# Patient Record
Sex: FEMALE | Race: WHITE | Employment: OTHER
[De-identification: names, ages, dates, MRNs, and addresses within clinical notes are randomized per-mention and may not be internally consistent; named-entity substitution may affect disease eponyms.]

---

## 2017-01-01 ENCOUNTER — TELEPHONE (OUTPATIENT)
Dept: OTHER | Facility: CLINIC | Age: 82
End: 2017-01-01

## 2017-01-10 PROBLEM — N39.0 UTI (URINARY TRACT INFECTION): Status: ACTIVE | Noted: 2017-01-10

## 2017-01-10 PROBLEM — C52 VAGINAL CANCER (HCC): Status: ACTIVE | Noted: 2017-01-10

## 2017-01-10 PROBLEM — D50.0 ANEMIA, BLOOD LOSS: Status: ACTIVE | Noted: 2017-01-10

## 2017-01-10 PROBLEM — N18.9 CKD (CHRONIC KIDNEY DISEASE): Chronic | Status: ACTIVE | Noted: 2017-01-10

## 2017-01-11 PROBLEM — N17.9 AKI (ACUTE KIDNEY INJURY) (HCC): Status: ACTIVE | Noted: 2017-01-11

## 2017-01-11 PROBLEM — N93.9 VAGINAL BLEEDING: Status: ACTIVE | Noted: 2017-01-11

## 2017-01-11 PROBLEM — C78.00 LUNG METASTASES (HCC): Status: ACTIVE | Noted: 2017-01-11

## 2017-01-11 PROBLEM — C54.1 ENDOMETRIAL CANCER, FIGO STAGE IVB (HCC): Status: ACTIVE | Noted: 2017-01-10

## 2017-01-11 PROBLEM — D62 ACUTE BLOOD LOSS ANEMIA: Status: ACTIVE | Noted: 2017-01-10

## 2017-02-28 PROBLEM — C52 VAGINAL CANCER (HCC): Status: ACTIVE | Noted: 2017-02-28

## 2017-11-08 NOTE — TELEPHONE ENCOUNTER
Attempted to contact patient regarding need for A1C.   There was no answer or way to leave a message

## 2018-01-01 ENCOUNTER — APPOINTMENT (OUTPATIENT)
Dept: CT IMAGING | Age: 83
End: 2018-01-01
Payer: MEDICARE

## 2018-01-01 ENCOUNTER — TELEPHONE (OUTPATIENT)
Dept: INFUSION THERAPY | Age: 83
End: 2018-01-01

## 2018-01-01 ENCOUNTER — HOSPITAL ENCOUNTER (EMERGENCY)
Age: 83
Discharge: NURSING, REHAB OR LONG-TERM CARE | End: 2018-07-05
Attending: EMERGENCY MEDICINE
Payer: MEDICARE

## 2018-01-01 ENCOUNTER — OFFICE VISIT (OUTPATIENT)
Dept: ONCOLOGY | Age: 83
End: 2018-01-01
Payer: MEDICARE

## 2018-01-01 ENCOUNTER — TELEPHONE (OUTPATIENT)
Dept: PALLATIVE CARE | Age: 83
End: 2018-01-01

## 2018-01-01 ENCOUNTER — TELEPHONE (OUTPATIENT)
Dept: FAMILY MEDICINE CLINIC | Age: 83
End: 2018-01-01

## 2018-01-01 ENCOUNTER — OFFICE VISIT (OUTPATIENT)
Dept: FAMILY MEDICINE CLINIC | Age: 83
End: 2018-01-01
Payer: MEDICARE

## 2018-01-01 ENCOUNTER — CLINICAL DOCUMENTATION (OUTPATIENT)
Dept: ONCOLOGY | Age: 83
End: 2018-01-01

## 2018-01-01 ENCOUNTER — HOSPITAL ENCOUNTER (OUTPATIENT)
Dept: INFUSION THERAPY | Age: 83
Discharge: HOME | End: 2018-05-09
Payer: MEDICARE

## 2018-01-01 ENCOUNTER — HOSPITAL ENCOUNTER (OUTPATIENT)
Dept: INFUSION THERAPY | Age: 83
Discharge: HOME | End: 2018-06-08
Payer: MEDICARE

## 2018-01-01 ENCOUNTER — HOSPITAL ENCOUNTER (OUTPATIENT)
Dept: INFUSION THERAPY | Age: 83
Discharge: HOME | End: 2018-04-18
Payer: MEDICARE

## 2018-01-01 ENCOUNTER — HOSPITAL ENCOUNTER (OUTPATIENT)
Dept: INFUSION THERAPY | Age: 83
Discharge: HOME | End: 2018-04-12
Payer: MEDICARE

## 2018-01-01 ENCOUNTER — HOSPITAL ENCOUNTER (OUTPATIENT)
Dept: INFUSION THERAPY | Age: 83
Discharge: HOME | End: 2018-06-07
Payer: MEDICARE

## 2018-01-01 ENCOUNTER — HOSPITAL ENCOUNTER (OUTPATIENT)
Age: 83
Discharge: HOME | End: 2018-06-20
Payer: MEDICARE

## 2018-01-01 ENCOUNTER — HOSPITAL ENCOUNTER (OUTPATIENT)
Dept: INFUSION THERAPY | Age: 83
Discharge: HOME | End: 2018-04-11
Payer: MEDICARE

## 2018-01-01 ENCOUNTER — APPOINTMENT (OUTPATIENT)
Dept: INFUSION THERAPY | Age: 83
End: 2018-01-01
Payer: MEDICARE

## 2018-01-01 ENCOUNTER — HOSPITAL ENCOUNTER (OUTPATIENT)
Dept: INFUSION THERAPY | Age: 83
Discharge: HOME | End: 2018-04-19
Payer: MEDICARE

## 2018-01-01 ENCOUNTER — APPOINTMENT (OUTPATIENT)
Dept: GENERAL RADIOLOGY | Age: 83
End: 2018-01-01
Payer: MEDICARE

## 2018-01-01 ENCOUNTER — HOSPITAL ENCOUNTER (OUTPATIENT)
Age: 83
Discharge: HOME | End: 2018-04-07
Payer: MEDICARE

## 2018-01-01 ENCOUNTER — HOSPITAL ENCOUNTER (OUTPATIENT)
Age: 83
Discharge: HOME | End: 2018-03-14
Payer: MEDICARE

## 2018-01-01 ENCOUNTER — OFFICE VISIT (OUTPATIENT)
Dept: PALLATIVE CARE | Age: 83
End: 2018-01-01
Payer: MEDICARE

## 2018-01-01 VITALS
WEIGHT: 151.4 LBS | HEIGHT: 62 IN | SYSTOLIC BLOOD PRESSURE: 155 MMHG | DIASTOLIC BLOOD PRESSURE: 45 MMHG | BODY MASS INDEX: 27.86 KG/M2 | TEMPERATURE: 97.9 F | HEART RATE: 70 BPM

## 2018-01-01 VITALS
BODY MASS INDEX: 28.71 KG/M2 | DIASTOLIC BLOOD PRESSURE: 70 MMHG | WEIGHT: 156 LBS | OXYGEN SATURATION: 99 % | HEIGHT: 62 IN | RESPIRATION RATE: 16 BRPM | SYSTOLIC BLOOD PRESSURE: 112 MMHG | TEMPERATURE: 97.8 F | HEART RATE: 73 BPM

## 2018-01-01 VITALS
SYSTOLIC BLOOD PRESSURE: 171 MMHG | DIASTOLIC BLOOD PRESSURE: 72 MMHG | TEMPERATURE: 97.8 F | RESPIRATION RATE: 20 BRPM | HEART RATE: 64 BPM

## 2018-01-01 VITALS
SYSTOLIC BLOOD PRESSURE: 138 MMHG | OXYGEN SATURATION: 97 % | HEART RATE: 63 BPM | HEIGHT: 62 IN | BODY MASS INDEX: 28.71 KG/M2 | DIASTOLIC BLOOD PRESSURE: 82 MMHG | RESPIRATION RATE: 18 BRPM | WEIGHT: 156 LBS

## 2018-01-01 VITALS
OXYGEN SATURATION: 100 % | SYSTOLIC BLOOD PRESSURE: 132 MMHG | BODY MASS INDEX: 27.33 KG/M2 | HEART RATE: 70 BPM | WEIGHT: 149.4 LBS | DIASTOLIC BLOOD PRESSURE: 60 MMHG

## 2018-01-01 VITALS
DIASTOLIC BLOOD PRESSURE: 84 MMHG | OXYGEN SATURATION: 98 % | HEART RATE: 79 BPM | WEIGHT: 160 LBS | SYSTOLIC BLOOD PRESSURE: 138 MMHG | HEIGHT: 62 IN | BODY MASS INDEX: 29.44 KG/M2

## 2018-01-01 VITALS
TEMPERATURE: 99 F | HEART RATE: 68 BPM | RESPIRATION RATE: 20 BRPM | DIASTOLIC BLOOD PRESSURE: 72 MMHG | SYSTOLIC BLOOD PRESSURE: 169 MMHG

## 2018-01-01 VITALS
SYSTOLIC BLOOD PRESSURE: 149 MMHG | HEIGHT: 62 IN | TEMPERATURE: 97.5 F | RESPIRATION RATE: 20 BRPM | DIASTOLIC BLOOD PRESSURE: 75 MMHG | HEART RATE: 65 BPM | BODY MASS INDEX: 28.06 KG/M2 | WEIGHT: 152.5 LBS

## 2018-01-01 VITALS
BODY MASS INDEX: 28.69 KG/M2 | TEMPERATURE: 97.9 F | DIASTOLIC BLOOD PRESSURE: 63 MMHG | WEIGHT: 155.9 LBS | HEART RATE: 72 BPM | RESPIRATION RATE: 20 BRPM | SYSTOLIC BLOOD PRESSURE: 150 MMHG | HEIGHT: 62 IN

## 2018-01-01 VITALS
WEIGHT: 156 LBS | BODY MASS INDEX: 28.71 KG/M2 | HEART RATE: 80 BPM | OXYGEN SATURATION: 97 % | HEIGHT: 62 IN | SYSTOLIC BLOOD PRESSURE: 114 MMHG | RESPIRATION RATE: 16 BRPM | DIASTOLIC BLOOD PRESSURE: 68 MMHG | TEMPERATURE: 98 F

## 2018-01-01 VITALS
BODY MASS INDEX: 29.24 KG/M2 | SYSTOLIC BLOOD PRESSURE: 154 MMHG | HEART RATE: 72 BPM | DIASTOLIC BLOOD PRESSURE: 76 MMHG | TEMPERATURE: 98.8 F | RESPIRATION RATE: 20 BRPM | WEIGHT: 158.9 LBS | HEIGHT: 62 IN

## 2018-01-01 VITALS
RESPIRATION RATE: 16 BRPM | OXYGEN SATURATION: 99 % | WEIGHT: 144.4 LBS | HEART RATE: 85 BPM | DIASTOLIC BLOOD PRESSURE: 51 MMHG | SYSTOLIC BLOOD PRESSURE: 129 MMHG | HEIGHT: 60 IN | BODY MASS INDEX: 28.35 KG/M2 | TEMPERATURE: 98.4 F

## 2018-01-01 VITALS — RESPIRATION RATE: 18 BRPM | DIASTOLIC BLOOD PRESSURE: 71 MMHG | SYSTOLIC BLOOD PRESSURE: 162 MMHG | HEART RATE: 61 BPM

## 2018-01-01 DIAGNOSIS — N76.0 RADIATION VAGINITIS: ICD-10-CM

## 2018-01-01 DIAGNOSIS — E78.00 PURE HYPERCHOLESTEROLEMIA: ICD-10-CM

## 2018-01-01 DIAGNOSIS — Z91.81 AT HIGH RISK FOR FALLS: Primary | ICD-10-CM

## 2018-01-01 DIAGNOSIS — C54.1 ENDOMETRIAL CANCER, FIGO STAGE IIIA (HCC): ICD-10-CM

## 2018-01-01 DIAGNOSIS — C54.1 ENDOMETRIAL CANCER, FIGO STAGE IIIA (HCC): Primary | ICD-10-CM

## 2018-01-01 DIAGNOSIS — D64.9 POSTOPERATIVE ANEMIA: Primary | ICD-10-CM

## 2018-01-01 DIAGNOSIS — R30.0 DYSURIA: ICD-10-CM

## 2018-01-01 DIAGNOSIS — N95.0 POST-MENOPAUSAL BLEEDING: ICD-10-CM

## 2018-01-01 DIAGNOSIS — N93.9 VAGINAL BLEEDING: ICD-10-CM

## 2018-01-01 DIAGNOSIS — D50.8 OTHER IRON DEFICIENCY ANEMIA: ICD-10-CM

## 2018-01-01 DIAGNOSIS — B37.0 OROPHARYNGEAL CANDIDIASIS: Primary | ICD-10-CM

## 2018-01-01 DIAGNOSIS — N39.0 URINARY TRACT INFECTION WITHOUT HEMATURIA, SITE UNSPECIFIED: Primary | ICD-10-CM

## 2018-01-01 DIAGNOSIS — L30.8 DERMATITIS ASSOCIATED WITH MOISTURE: ICD-10-CM

## 2018-01-01 DIAGNOSIS — R32 URINARY INCONTINENCE, UNSPECIFIED TYPE: Primary | ICD-10-CM

## 2018-01-01 DIAGNOSIS — D64.9 ANEMIA, UNSPECIFIED TYPE: ICD-10-CM

## 2018-01-01 DIAGNOSIS — N18.30 STAGE 3 CHRONIC KIDNEY DISEASE (HCC): Chronic | ICD-10-CM

## 2018-01-01 DIAGNOSIS — R32 URINARY INCONTINENCE, UNSPECIFIED TYPE: ICD-10-CM

## 2018-01-01 DIAGNOSIS — C54.1 ENDOMETRIAL CA (HCC): ICD-10-CM

## 2018-01-01 DIAGNOSIS — E11.22 TYPE 2 DIABETES MELLITUS WITH CHRONIC KIDNEY DISEASE, WITHOUT LONG-TERM CURRENT USE OF INSULIN, UNSPECIFIED CKD STAGE (HCC): ICD-10-CM

## 2018-01-01 DIAGNOSIS — R30.0 DYSURIA: Primary | ICD-10-CM

## 2018-01-01 DIAGNOSIS — C52 VAGINAL CANCER (HCC): ICD-10-CM

## 2018-01-01 DIAGNOSIS — N17.9 AKI (ACUTE KIDNEY INJURY) (HCC): ICD-10-CM

## 2018-01-01 DIAGNOSIS — M70.22 OLECRANON BURSITIS OF LEFT ELBOW: ICD-10-CM

## 2018-01-01 DIAGNOSIS — D50.8 OTHER IRON DEFICIENCY ANEMIA: Primary | ICD-10-CM

## 2018-01-01 DIAGNOSIS — N17.9 ACUTE RENAL FAILURE, UNSPECIFIED ACUTE RENAL FAILURE TYPE (HCC): Primary | ICD-10-CM

## 2018-01-01 DIAGNOSIS — Z51.5 PALLIATIVE CARE BY SPECIALIST: ICD-10-CM

## 2018-01-01 DIAGNOSIS — G89.29 CHRONIC BILATERAL LOW BACK PAIN WITHOUT SCIATICA: ICD-10-CM

## 2018-01-01 DIAGNOSIS — D64.9 POSTOPERATIVE ANEMIA: ICD-10-CM

## 2018-01-01 DIAGNOSIS — M54.50 CHRONIC BILATERAL LOW BACK PAIN WITHOUT SCIATICA: ICD-10-CM

## 2018-01-01 DIAGNOSIS — I10 ESSENTIAL HYPERTENSION: ICD-10-CM

## 2018-01-01 DIAGNOSIS — R06.2 WHEEZING: ICD-10-CM

## 2018-01-01 DIAGNOSIS — C78.00 MALIGNANT NEOPLASM METASTATIC TO LUNG, UNSPECIFIED LATERALITY (HCC): ICD-10-CM

## 2018-01-01 DIAGNOSIS — R19.7 DIARRHEA, UNSPECIFIED TYPE: Primary | ICD-10-CM

## 2018-01-01 LAB
ABO/RH: NORMAL
ALBUMIN SERPL-MCNC: 3.8 G/DL (ref 3.5–5.2)
ALBUMIN SERPL-MCNC: 4 G/DL (ref 3.5–5.2)
ALP BLD-CCNC: 45 U/L (ref 35–104)
ALP BLD-CCNC: 56 U/L (ref 35–104)
ALT SERPL-CCNC: 10 U/L (ref 0–32)
ALT SERPL-CCNC: 12 U/L (ref 0–32)
AMORPHOUS: ABNORMAL
ANION GAP SERPL CALCULATED.3IONS-SCNC: 14 MMOL/L (ref 7–16)
ANION GAP SERPL CALCULATED.3IONS-SCNC: 18 MMOL/L (ref 7–16)
ANION GAP SERPL CALCULATED.3IONS-SCNC: 21 MMOL/L (ref 7–16)
ANTIBODY SCREEN: NORMAL
AST SERPL-CCNC: 12 U/L (ref 0–31)
AST SERPL-CCNC: 15 U/L (ref 0–31)
BACTERIA: ABNORMAL /HPF
BASOPHILS ABSOLUTE: 0.03 E9/L (ref 0–0.2)
BASOPHILS ABSOLUTE: 0.05 E9/L (ref 0–0.2)
BASOPHILS ABSOLUTE: 0.05 E9/L (ref 0–0.2)
BASOPHILS ABSOLUTE: 0.07 E9/L (ref 0–0.2)
BASOPHILS RELATIVE PERCENT: 0.6 % (ref 0–2)
BASOPHILS RELATIVE PERCENT: 0.7 % (ref 0–2)
BASOPHILS RELATIVE PERCENT: 0.7 % (ref 0–2)
BASOPHILS RELATIVE PERCENT: 0.8 % (ref 0–2)
BILIRUB SERPL-MCNC: 0.2 MG/DL (ref 0–1.2)
BILIRUB SERPL-MCNC: <0.2 MG/DL (ref 0–1.2)
BILIRUBIN URINE: ABNORMAL
BILIRUBIN, POC: NEGATIVE
BLOOD BANK DISPENSE STATUS: NORMAL
BLOOD BANK PRODUCT CODE: NORMAL
BLOOD URINE, POC: NORMAL
BLOOD, URINE: ABNORMAL
BODY FLUID CULTURE, STERILE: NORMAL
BPU ID: NORMAL
BUN BLDV-MCNC: 18 MG/DL (ref 8–23)
BUN BLDV-MCNC: 20 MG/DL (ref 8–23)
BUN BLDV-MCNC: 58 MG/DL (ref 8–23)
CA 125: 44.3 U/ML (ref 0–35)
CA 125: 50.8 U/ML (ref 0–35)
CALCIUM SERPL-MCNC: 8.9 MG/DL (ref 8.6–10.2)
CALCIUM SERPL-MCNC: 9.4 MG/DL (ref 8.6–10.2)
CALCIUM SERPL-MCNC: 9.7 MG/DL (ref 8.6–10.2)
CHLORIDE BLD-SCNC: 93 MMOL/L (ref 98–107)
CHLORIDE BLD-SCNC: 96 MMOL/L (ref 98–107)
CHLORIDE BLD-SCNC: 99 MMOL/L (ref 98–107)
CLARITY, POC: CLEAR
CLARITY: ABNORMAL
CO2: 15 MMOL/L (ref 22–29)
CO2: 23 MMOL/L (ref 22–29)
CO2: 25 MMOL/L (ref 22–29)
COLOR, POC: YELLOW
COLOR: ABNORMAL
CREAT SERPL-MCNC: 1.2 MG/DL (ref 0.5–1)
CREAT SERPL-MCNC: 1.3 MG/DL (ref 0.5–1)
CREAT SERPL-MCNC: 4.3 MG/DL (ref 0.5–1)
CRYSTALS, UA: ABNORMAL
DESCRIPTION BLOOD BANK: NORMAL
EKG ATRIAL RATE: 77 BPM
EKG P AXIS: 73 DEGREES
EKG P-R INTERVAL: 218 MS
EKG Q-T INTERVAL: 374 MS
EKG QRS DURATION: 82 MS
EKG QTC CALCULATION (BAZETT): 423 MS
EKG R AXIS: -12 DEGREES
EKG T AXIS: 172 DEGREES
EKG VENTRICULAR RATE: 77 BPM
EOSINOPHILS ABSOLUTE: 0.07 E9/L (ref 0.05–0.5)
EOSINOPHILS ABSOLUTE: 0.09 E9/L (ref 0.05–0.5)
EOSINOPHILS ABSOLUTE: 0.1 E9/L (ref 0.05–0.5)
EOSINOPHILS ABSOLUTE: 0.15 E9/L (ref 0.05–0.5)
EOSINOPHILS RELATIVE PERCENT: 1.2 % (ref 0–6)
EOSINOPHILS RELATIVE PERCENT: 1.2 % (ref 0–6)
EOSINOPHILS RELATIVE PERCENT: 1.4 % (ref 0–6)
EOSINOPHILS RELATIVE PERCENT: 1.9 % (ref 0–6)
FERRITIN: 116 NG/ML
GFR AFRICAN AMERICAN: 12
GFR AFRICAN AMERICAN: 48
GFR AFRICAN AMERICAN: 52
GFR NON-AFRICAN AMERICAN: 10 ML/MIN/1.73
GFR NON-AFRICAN AMERICAN: 39 ML/MIN/1.73
GFR NON-AFRICAN AMERICAN: 43 ML/MIN/1.73
GLUCOSE BLD-MCNC: 101 MG/DL (ref 74–109)
GLUCOSE BLD-MCNC: 68 MG/DL (ref 74–109)
GLUCOSE BLD-MCNC: 80 MG/DL (ref 74–109)
GLUCOSE URINE, POC: NEGATIVE
GLUCOSE URINE: 100 MG/DL
GRAM STAIN RESULT: NORMAL
HBA1C MFR BLD: 5.5 %
HCT VFR BLD CALC: 27.1 % (ref 34–48)
HCT VFR BLD CALC: 28 % (ref 34–48)
HCT VFR BLD CALC: 30.8 % (ref 34–48)
HCT VFR BLD CALC: 31.1 % (ref 34–48)
HCT VFR BLD CALC: 32.8 % (ref 34–48)
HCT VFR BLD CALC: 37.1 % (ref 34–48)
HEMOGLOBIN: 10.2 G/DL (ref 11.5–15.5)
HEMOGLOBIN: 12.2 G/DL (ref 11.5–15.5)
HEMOGLOBIN: 8.5 G/DL (ref 11.5–15.5)
HEMOGLOBIN: 8.8 G/DL (ref 11.5–15.5)
HEMOGLOBIN: 9.5 G/DL (ref 11.5–15.5)
HEMOGLOBIN: 9.9 G/DL (ref 11.5–15.5)
IMMATURE GRANULOCYTES #: 0.02 E9/L
IMMATURE GRANULOCYTES #: 0.03 E9/L
IMMATURE GRANULOCYTES #: 0.03 E9/L
IMMATURE GRANULOCYTES #: 0.05 E9/L
IMMATURE GRANULOCYTES %: 0.4 % (ref 0–5)
IMMATURE GRANULOCYTES %: 0.4 % (ref 0–5)
IMMATURE GRANULOCYTES %: 0.5 % (ref 0–5)
IMMATURE GRANULOCYTES %: 0.5 % (ref 0–5)
IRON SATURATION: 16 % (ref 15–50)
IRON: 35 MCG/DL (ref 37–145)
KETONES, POC: NEGATIVE
KETONES, URINE: 15 MG/DL
LACTIC ACID: 1.1 MMOL/L (ref 0.5–2.2)
LEUKOCYTE EST, POC: NORMAL
LEUKOCYTE ESTERASE, URINE: ABNORMAL
LYMPHOCYTES ABSOLUTE: 0.84 E9/L (ref 1.5–4)
LYMPHOCYTES ABSOLUTE: 0.93 E9/L (ref 1.5–4)
LYMPHOCYTES ABSOLUTE: 1.08 E9/L (ref 1.5–4)
LYMPHOCYTES ABSOLUTE: 1.36 E9/L (ref 1.5–4)
LYMPHOCYTES RELATIVE PERCENT: 11.2 % (ref 20–42)
LYMPHOCYTES RELATIVE PERCENT: 12.8 % (ref 20–42)
LYMPHOCYTES RELATIVE PERCENT: 14.5 % (ref 20–42)
LYMPHOCYTES RELATIVE PERCENT: 22.2 % (ref 20–42)
MCH RBC QN AUTO: 25.9 PG (ref 26–35)
MCH RBC QN AUTO: 26.5 PG (ref 26–35)
MCH RBC QN AUTO: 27 PG (ref 26–35)
MCH RBC QN AUTO: 27.4 PG (ref 26–35)
MCH RBC QN AUTO: 27.7 PG (ref 26–35)
MCH RBC QN AUTO: 28.2 PG (ref 26–35)
MCHC RBC AUTO-ENTMCNC: 30.5 % (ref 32–34.5)
MCHC RBC AUTO-ENTMCNC: 31.1 % (ref 32–34.5)
MCHC RBC AUTO-ENTMCNC: 31.4 % (ref 32–34.5)
MCHC RBC AUTO-ENTMCNC: 31.4 % (ref 32–34.5)
MCHC RBC AUTO-ENTMCNC: 32.1 % (ref 32–34.5)
MCHC RBC AUTO-ENTMCNC: 32.9 % (ref 32–34.5)
MCV RBC AUTO: 84.3 FL (ref 80–99.9)
MCV RBC AUTO: 84.7 FL (ref 80–99.9)
MCV RBC AUTO: 85.7 FL (ref 80–99.9)
MCV RBC AUTO: 86 FL (ref 80–99.9)
MCV RBC AUTO: 86.8 FL (ref 80–99.9)
MCV RBC AUTO: 87.4 FL (ref 80–99.9)
MONOCYTES ABSOLUTE: 0.36 E9/L (ref 0.1–0.95)
MONOCYTES ABSOLUTE: 0.59 E9/L (ref 0.1–0.95)
MONOCYTES ABSOLUTE: 0.65 E9/L (ref 0.1–0.95)
MONOCYTES ABSOLUTE: 0.99 E9/L (ref 0.1–0.95)
MONOCYTES RELATIVE PERCENT: 7.1 % (ref 2–12)
MONOCYTES RELATIVE PERCENT: 8.7 % (ref 2–12)
MONOCYTES RELATIVE PERCENT: 9.3 % (ref 2–12)
MONOCYTES RELATIVE PERCENT: 9.5 % (ref 2–12)
NEUTROPHILS ABSOLUTE: 2.46 E9/L (ref 1.8–7.3)
NEUTROPHILS ABSOLUTE: 5.57 E9/L (ref 1.8–7.3)
NEUTROPHILS ABSOLUTE: 6.59 E9/L (ref 1.8–7.3)
NEUTROPHILS ABSOLUTE: 8.02 E9/L (ref 1.8–7.3)
NEUTROPHILS RELATIVE PERCENT: 65.1 % (ref 43–80)
NEUTROPHILS RELATIVE PERCENT: 74.5 % (ref 43–80)
NEUTROPHILS RELATIVE PERCENT: 75.3 % (ref 43–80)
NEUTROPHILS RELATIVE PERCENT: 79.5 % (ref 43–80)
NITRITE, POC: NEGATIVE
NITRITE, URINE: POSITIVE
ORGANISM: ABNORMAL
PDW BLD-RTO: 13.6 FL (ref 11.5–15)
PDW BLD-RTO: 14 FL (ref 11.5–15)
PDW BLD-RTO: 14.2 FL (ref 11.5–15)
PDW BLD-RTO: 14.9 FL (ref 11.5–15)
PDW BLD-RTO: 15.4 FL (ref 11.5–15)
PDW BLD-RTO: 15.4 FL (ref 11.5–15)
PH UA: 7.5 (ref 5–9)
PH, POC: 6.5
PLATELET # BLD: 205 E9/L (ref 130–450)
PLATELET # BLD: 277 E9/L (ref 130–450)
PLATELET # BLD: 281 E9/L (ref 130–450)
PLATELET # BLD: 336 E9/L (ref 130–450)
PLATELET # BLD: 354 E9/L (ref 130–450)
PLATELET # BLD: 397 E9/L (ref 130–450)
PMV BLD AUTO: 8.7 FL (ref 7–12)
PMV BLD AUTO: 9 FL (ref 7–12)
PMV BLD AUTO: 9 FL (ref 7–12)
PMV BLD AUTO: 9.1 FL (ref 7–12)
PMV BLD AUTO: 9.3 FL (ref 7–12)
PMV BLD AUTO: 9.6 FL (ref 7–12)
POTASSIUM SERPL-SCNC: 4.7 MMOL/L (ref 3.5–5)
POTASSIUM SERPL-SCNC: 5.1 MMOL/L (ref 3.5–5)
POTASSIUM SERPL-SCNC: 5.5 MMOL/L (ref 3.5–5)
PROTEIN UA: >=300 MG/DL
PROTEIN, POC: NORMAL
RBC # BLD: 3.1 E12/L (ref 3.5–5.5)
RBC # BLD: 3.32 E12/L (ref 3.5–5.5)
RBC # BLD: 3.58 E12/L (ref 3.5–5.5)
RBC # BLD: 3.67 E12/L (ref 3.5–5.5)
RBC # BLD: 3.78 E12/L (ref 3.5–5.5)
RBC # BLD: 4.33 E12/L (ref 3.5–5.5)
RBC UA: >20 /HPF (ref 0–2)
SODIUM BLD-SCNC: 132 MMOL/L (ref 132–146)
SODIUM BLD-SCNC: 135 MMOL/L (ref 132–146)
SODIUM BLD-SCNC: 137 MMOL/L (ref 132–146)
SPECIFIC GRAVITY UA: 1.01 (ref 1–1.03)
SPECIFIC GRAVITY, POC: 1.01
TOTAL IRON BINDING CAPACITY: 214 MCG/DL (ref 250–450)
TOTAL PROTEIN: 6.9 G/DL (ref 6.4–8.3)
TOTAL PROTEIN: 7.7 G/DL (ref 6.4–8.3)
TROPONIN: 0.01 NG/ML (ref 0–0.03)
URINE CULTURE, ROUTINE: ABNORMAL
URINE CULTURE, ROUTINE: ABNORMAL
URINE CULTURE, ROUTINE: NORMAL
UROBILINOGEN, POC: 0.2
UROBILINOGEN, URINE: 4 E.U./DL
WBC # BLD: 10.6 E9/L (ref 4.5–11.5)
WBC # BLD: 3.8 E9/L (ref 4.5–11.5)
WBC # BLD: 7.5 E9/L (ref 4.5–11.5)
WBC # BLD: 8.3 E9/L (ref 4.5–11.5)
WBC # BLD: 8.5 E9/L (ref 4.5–11.5)
WBC # BLD: 9.5 E9/L (ref 4.5–11.5)
WBC UA: >20 /HPF (ref 0–5)

## 2018-01-01 PROCEDURE — 6360000002 HC RX W HCPCS: Performed by: INTERNAL MEDICINE

## 2018-01-01 PROCEDURE — G8417 CALC BMI ABV UP PARAM F/U: HCPCS | Performed by: FAMILY MEDICINE

## 2018-01-01 PROCEDURE — 99212 OFFICE O/P EST SF 10 MIN: CPT | Performed by: INTERNAL MEDICINE

## 2018-01-01 PROCEDURE — 87186 SC STD MICRODIL/AGAR DIL: CPT

## 2018-01-01 PROCEDURE — G8598 ASA/ANTIPLAT THER USED: HCPCS | Performed by: FAMILY MEDICINE

## 2018-01-01 PROCEDURE — 80053 COMPREHEN METABOLIC PANEL: CPT

## 2018-01-01 PROCEDURE — 87088 URINE BACTERIA CULTURE: CPT

## 2018-01-01 PROCEDURE — 1123F ACP DISCUSS/DSCN MKR DOCD: CPT | Performed by: FAMILY MEDICINE

## 2018-01-01 PROCEDURE — 2580000003 HC RX 258: Performed by: INTERNAL MEDICINE

## 2018-01-01 PROCEDURE — G8399 PT W/DXA RESULTS DOCUMENT: HCPCS | Performed by: FAMILY MEDICINE

## 2018-01-01 PROCEDURE — 85025 COMPLETE CBC W/AUTO DIFF WBC: CPT

## 2018-01-01 PROCEDURE — 1090F PRES/ABSN URINE INCON ASSESS: CPT | Performed by: INTERNAL MEDICINE

## 2018-01-01 PROCEDURE — 99214 OFFICE O/P EST MOD 30 MIN: CPT | Performed by: INTERNAL MEDICINE

## 2018-01-01 PROCEDURE — 99212 OFFICE O/P EST SF 10 MIN: CPT

## 2018-01-01 PROCEDURE — 70450 CT HEAD/BRAIN W/O DYE: CPT

## 2018-01-01 PROCEDURE — G8484 FLU IMMUNIZE NO ADMIN: HCPCS | Performed by: FAMILY MEDICINE

## 2018-01-01 PROCEDURE — 83540 ASSAY OF IRON: CPT

## 2018-01-01 PROCEDURE — 86901 BLOOD TYPING SEROLOGIC RH(D): CPT

## 2018-01-01 PROCEDURE — 99214 OFFICE O/P EST MOD 30 MIN: CPT | Performed by: FAMILY MEDICINE

## 2018-01-01 PROCEDURE — G8417 CALC BMI ABV UP PARAM F/U: HCPCS | Performed by: INTERNAL MEDICINE

## 2018-01-01 PROCEDURE — 83550 IRON BINDING TEST: CPT

## 2018-01-01 PROCEDURE — 36415 COLL VENOUS BLD VENIPUNCTURE: CPT

## 2018-01-01 PROCEDURE — 1036F TOBACCO NON-USER: CPT | Performed by: FAMILY MEDICINE

## 2018-01-01 PROCEDURE — 4040F PNEUMOC VAC/ADMIN/RCVD: CPT | Performed by: FAMILY MEDICINE

## 2018-01-01 PROCEDURE — 80048 BASIC METABOLIC PNL TOTAL CA: CPT

## 2018-01-01 PROCEDURE — G8598 ASA/ANTIPLAT THER USED: HCPCS | Performed by: INTERNAL MEDICINE

## 2018-01-01 PROCEDURE — 4040F PNEUMOC VAC/ADMIN/RCVD: CPT | Performed by: INTERNAL MEDICINE

## 2018-01-01 PROCEDURE — 71045 X-RAY EXAM CHEST 1 VIEW: CPT

## 2018-01-01 PROCEDURE — 1123F ACP DISCUSS/DSCN MKR DOCD: CPT | Performed by: INTERNAL MEDICINE

## 2018-01-01 PROCEDURE — 87077 CULTURE AEROBIC IDENTIFY: CPT

## 2018-01-01 PROCEDURE — 99204 OFFICE O/P NEW MOD 45 MIN: CPT | Performed by: FAMILY MEDICINE

## 2018-01-01 PROCEDURE — 83036 HEMOGLOBIN GLYCOSYLATED A1C: CPT | Performed by: FAMILY MEDICINE

## 2018-01-01 PROCEDURE — 96374 THER/PROPH/DIAG INJ IV PUSH: CPT

## 2018-01-01 PROCEDURE — G8399 PT W/DXA RESULTS DOCUMENT: HCPCS | Performed by: INTERNAL MEDICINE

## 2018-01-01 PROCEDURE — 82728 ASSAY OF FERRITIN: CPT

## 2018-01-01 PROCEDURE — 85027 COMPLETE CBC AUTOMATED: CPT

## 2018-01-01 PROCEDURE — 1090F PRES/ABSN URINE INCON ASSESS: CPT | Performed by: FAMILY MEDICINE

## 2018-01-01 PROCEDURE — 96376 TX/PRO/DX INJ SAME DRUG ADON: CPT

## 2018-01-01 PROCEDURE — 81001 URINALYSIS AUTO W/SCOPE: CPT

## 2018-01-01 PROCEDURE — G8427 DOCREV CUR MEDS BY ELIG CLIN: HCPCS | Performed by: INTERNAL MEDICINE

## 2018-01-01 PROCEDURE — 83605 ASSAY OF LACTIC ACID: CPT

## 2018-01-01 PROCEDURE — 36415 COLL VENOUS BLD VENIPUNCTURE: CPT | Performed by: INTERNAL MEDICINE

## 2018-01-01 PROCEDURE — 86850 RBC ANTIBODY SCREEN: CPT

## 2018-01-01 PROCEDURE — 99213 OFFICE O/P EST LOW 20 MIN: CPT | Performed by: FAMILY MEDICINE

## 2018-01-01 PROCEDURE — 86304 IMMUNOASSAY TUMOR CA 125: CPT

## 2018-01-01 PROCEDURE — 81002 URINALYSIS NONAUTO W/O SCOPE: CPT | Performed by: FAMILY MEDICINE

## 2018-01-01 PROCEDURE — 3288F FALL RISK ASSESSMENT DOCD: CPT | Performed by: FAMILY MEDICINE

## 2018-01-01 PROCEDURE — G8427 DOCREV CUR MEDS BY ELIG CLIN: HCPCS | Performed by: FAMILY MEDICINE

## 2018-01-01 PROCEDURE — 99291 CRITICAL CARE FIRST HOUR: CPT

## 2018-01-01 PROCEDURE — 2580000003 HC RX 258: Performed by: EMERGENCY MEDICINE

## 2018-01-01 PROCEDURE — 86900 BLOOD TYPING SEROLOGIC ABO: CPT

## 2018-01-01 PROCEDURE — 86923 COMPATIBILITY TEST ELECTRIC: CPT

## 2018-01-01 PROCEDURE — 2580000003 HC RX 258: Performed by: NURSE PRACTITIONER

## 2018-01-01 PROCEDURE — 1036F TOBACCO NON-USER: CPT | Performed by: INTERNAL MEDICINE

## 2018-01-01 PROCEDURE — 36430 TRANSFUSION BLD/BLD COMPNT: CPT

## 2018-01-01 PROCEDURE — 84484 ASSAY OF TROPONIN QUANT: CPT

## 2018-01-01 PROCEDURE — 87070 CULTURE OTHR SPECIMN AEROBIC: CPT

## 2018-01-01 PROCEDURE — 20605 DRAIN/INJ JOINT/BURSA W/O US: CPT | Performed by: FAMILY MEDICINE

## 2018-01-01 PROCEDURE — 99213 OFFICE O/P EST LOW 20 MIN: CPT

## 2018-01-01 PROCEDURE — 96365 THER/PROPH/DIAG IV INF INIT: CPT

## 2018-01-01 PROCEDURE — G8510 SCR DEP NEG, NO PLAN REQD: HCPCS | Performed by: FAMILY MEDICINE

## 2018-01-01 PROCEDURE — 6360000002 HC RX W HCPCS: Performed by: EMERGENCY MEDICINE

## 2018-01-01 PROCEDURE — P9016 RBC LEUKOCYTES REDUCED: HCPCS

## 2018-01-01 PROCEDURE — 87205 SMEAR GRAM STAIN: CPT

## 2018-01-01 PROCEDURE — 99205 OFFICE O/P NEW HI 60 MIN: CPT | Performed by: FAMILY MEDICINE

## 2018-01-01 RX ORDER — FENTANYL 12 UG/H
1 PATCH TRANSDERMAL
Qty: 7 PATCH | Refills: 0 | Status: SHIPPED | OUTPATIENT
Start: 2018-01-01 | End: 2018-01-01 | Stop reason: SDUPTHER

## 2018-01-01 RX ORDER — SODIUM CHLORIDE 9 MG/ML
INJECTION, SOLUTION INTRAVENOUS CONTINUOUS
Status: CANCELLED | OUTPATIENT
Start: 2018-01-01

## 2018-01-01 RX ORDER — AMOXICILLIN AND CLAVULANATE POTASSIUM 875; 125 MG/1; MG/1
1 TABLET, FILM COATED ORAL 2 TIMES DAILY
Qty: 20 TABLET | Refills: 0 | Status: SHIPPED | OUTPATIENT
Start: 2018-01-01 | End: 2018-01-01

## 2018-01-01 RX ORDER — METHYLPREDNISOLONE SODIUM SUCCINATE 125 MG/2ML
125 INJECTION, POWDER, LYOPHILIZED, FOR SOLUTION INTRAMUSCULAR; INTRAVENOUS ONCE
Status: CANCELLED | OUTPATIENT
Start: 2018-01-01 | End: 2018-01-01

## 2018-01-01 RX ORDER — SODIUM CHLORIDE 0.9 % (FLUSH) 0.9 %
10 SYRINGE (ML) INJECTION PRN
OUTPATIENT
Start: 2018-01-01

## 2018-01-01 RX ORDER — QUETIAPINE FUMARATE 25 MG/1
25 TABLET, FILM COATED ORAL EVERY 4 HOURS PRN
Qty: 90 TABLET | Refills: 3 | Status: SHIPPED | OUTPATIENT
Start: 2018-01-01

## 2018-01-01 RX ORDER — SODIUM CHLORIDE 0.9 % (FLUSH) 0.9 %
10 SYRINGE (ML) INJECTION PRN
Status: CANCELLED | OUTPATIENT
Start: 2018-01-01

## 2018-01-01 RX ORDER — CARVEDILOL 25 MG/1
TABLET ORAL
Qty: 60 TABLET | Refills: 1 | Status: SHIPPED | OUTPATIENT
Start: 2018-01-01 | End: 2018-01-01 | Stop reason: SDUPTHER

## 2018-01-01 RX ORDER — SODIUM CHLORIDE 9 MG/ML
INJECTION, SOLUTION INTRAVENOUS ONCE
Status: COMPLETED | OUTPATIENT
Start: 2018-01-01 | End: 2018-01-01

## 2018-01-01 RX ORDER — HEPARIN SODIUM (PORCINE) LOCK FLUSH IV SOLN 100 UNIT/ML 100 UNIT/ML
500 SOLUTION INTRAVENOUS PRN
OUTPATIENT
Start: 2018-01-01

## 2018-01-01 RX ORDER — FENTANYL 25 UG/H
1 PATCH TRANSDERMAL
Qty: 7 PATCH | Refills: 0 | Status: SHIPPED | OUTPATIENT
Start: 2018-01-01 | End: 2018-01-01 | Stop reason: SDUPTHER

## 2018-01-01 RX ORDER — HEPARIN SODIUM (PORCINE) LOCK FLUSH IV SOLN 100 UNIT/ML 100 UNIT/ML
500 SOLUTION INTRAVENOUS PRN
Status: CANCELLED | OUTPATIENT
Start: 2018-01-01

## 2018-01-01 RX ORDER — DIAPER,BRIEF,ADULT, DISPOSABLE
EACH MISCELLANEOUS
Qty: 100 EACH | Refills: 3 | Status: SHIPPED | OUTPATIENT
Start: 2018-01-01 | End: 2018-01-01 | Stop reason: ALTCHOICE

## 2018-01-01 RX ORDER — LISINOPRIL 20 MG/1
20 TABLET ORAL DAILY
Qty: 30 TABLET | Refills: 1 | Status: SHIPPED | OUTPATIENT
Start: 2018-01-01 | End: 2018-01-01 | Stop reason: SDUPTHER

## 2018-01-01 RX ORDER — SODIUM CHLORIDE 9 MG/ML
INJECTION, SOLUTION INTRAVENOUS ONCE
OUTPATIENT
Start: 2018-01-01 | End: 2018-01-01

## 2018-01-01 RX ORDER — FENTANYL CITRATE 50 UG/ML
25 INJECTION, SOLUTION INTRAMUSCULAR; INTRAVENOUS ONCE
Status: COMPLETED | OUTPATIENT
Start: 2018-01-01 | End: 2018-01-01

## 2018-01-01 RX ORDER — FENTANYL 25 UG/H
1 PATCH TRANSDERMAL
Qty: 7 PATCH | Refills: 0 | Status: SHIPPED | OUTPATIENT
Start: 2018-01-01 | End: 2018-11-16

## 2018-01-01 RX ORDER — 0.9 % SODIUM CHLORIDE 0.9 %
500 INTRAVENOUS SOLUTION INTRAVENOUS ONCE
Status: COMPLETED | OUTPATIENT
Start: 2018-01-01 | End: 2018-01-01

## 2018-01-01 RX ORDER — DIPHENHYDRAMINE HYDROCHLORIDE 50 MG/ML
50 INJECTION INTRAMUSCULAR; INTRAVENOUS ONCE
Status: CANCELLED | OUTPATIENT
Start: 2018-01-01 | End: 2018-01-01

## 2018-01-01 RX ORDER — 0.9 % SODIUM CHLORIDE 0.9 %
10 VIAL (ML) INJECTION ONCE
Status: CANCELLED | OUTPATIENT
Start: 2018-01-01 | End: 2018-01-01

## 2018-01-01 RX ORDER — PHENOL 1.4 %
10 AEROSOL, SPRAY (ML) MUCOUS MEMBRANE NIGHTLY
Qty: 30 TABLET | Refills: 3 | Status: SHIPPED | OUTPATIENT
Start: 2018-01-01

## 2018-01-01 RX ORDER — SODIUM CHLORIDE 9 MG/ML
INJECTION, SOLUTION INTRAVENOUS ONCE
Status: CANCELLED | OUTPATIENT
Start: 2018-01-01 | End: 2018-01-01

## 2018-01-01 RX ORDER — FENTANYL 12 UG/H
1 PATCH TRANSDERMAL
Qty: 7 PATCH | Refills: 0 | Status: SHIPPED | OUTPATIENT
Start: 2018-01-01 | End: 2018-01-01

## 2018-01-01 RX ORDER — POLYETHYLENE GLYCOL 3350 17 G/17G
17 POWDER, FOR SOLUTION ORAL DAILY
Qty: 510 G | Refills: 1 | Status: SHIPPED | OUTPATIENT
Start: 2018-01-01 | End: 2018-01-01

## 2018-01-01 RX ORDER — SODIUM CHLORIDE 9 MG/ML
100 INJECTION, SOLUTION INTRAVENOUS CONTINUOUS
Status: CANCELLED | OUTPATIENT
Start: 2018-01-01

## 2018-01-01 RX ORDER — SODIUM CHLORIDE 9 MG/ML
100 INJECTION, SOLUTION INTRAVENOUS CONTINUOUS
OUTPATIENT
Start: 2018-01-01

## 2018-01-01 RX ORDER — POLYETHYLENE GLYCOL 3350 17 G/17G
17 POWDER, FOR SOLUTION ORAL DAILY
Qty: 510 G | Refills: 2 | Status: SHIPPED | OUTPATIENT
Start: 2018-01-01 | End: 2018-01-01 | Stop reason: SDUPTHER

## 2018-01-01 RX ORDER — EPINEPHRINE 1 MG/ML
0.3 INJECTION, SOLUTION, CONCENTRATE INTRAVENOUS PRN
Status: CANCELLED | OUTPATIENT
Start: 2018-01-01

## 2018-01-01 RX ORDER — SODIUM CHLORIDE 0.9 % (FLUSH) 0.9 %
5 SYRINGE (ML) INJECTION PRN
Status: CANCELLED | OUTPATIENT
Start: 2018-01-01

## 2018-01-01 RX ORDER — PRAVASTATIN SODIUM 40 MG
TABLET ORAL
Qty: 30 TABLET | Refills: 2 | Status: SHIPPED | OUTPATIENT
Start: 2018-01-01 | End: 2018-01-01 | Stop reason: ALTCHOICE

## 2018-01-01 RX ORDER — ONDANSETRON 4 MG/1
4 TABLET, FILM COATED ORAL EVERY 4 HOURS
Qty: 20 TABLET | Refills: 0 | Status: SHIPPED | OUTPATIENT
Start: 2018-01-01 | End: 2018-01-01 | Stop reason: SDUPTHER

## 2018-01-01 RX ORDER — CARVEDILOL 25 MG/1
TABLET ORAL
Qty: 60 TABLET | Refills: 2 | Status: SHIPPED | OUTPATIENT
Start: 2018-01-01

## 2018-01-01 RX ORDER — SODIUM CHLORIDE 0.9 % (FLUSH) 0.9 %
20 SYRINGE (ML) INJECTION PRN
Status: CANCELLED | OUTPATIENT
Start: 2018-01-01

## 2018-01-01 RX ORDER — HYDROCHLOROTHIAZIDE 25 MG/1
12.5 TABLET ORAL DAILY
Qty: 15 TABLET | Refills: 1 | Status: SHIPPED | OUTPATIENT
Start: 2018-01-01 | End: 2018-01-01 | Stop reason: SDUPTHER

## 2018-01-01 RX ORDER — OXYCODONE HYDROCHLORIDE 10 MG/1
10 TABLET ORAL EVERY 4 HOURS PRN
Qty: 90 TABLET | Refills: 0 | Status: SHIPPED | OUTPATIENT
Start: 2018-01-01 | End: 2018-01-01 | Stop reason: SDUPTHER

## 2018-01-01 RX ORDER — METHYLPREDNISOLONE SODIUM SUCCINATE 125 MG/2ML
125 INJECTION, POWDER, LYOPHILIZED, FOR SOLUTION INTRAMUSCULAR; INTRAVENOUS ONCE
OUTPATIENT
Start: 2018-01-01 | End: 2018-01-01

## 2018-01-01 RX ORDER — PRAVASTATIN SODIUM 40 MG
TABLET ORAL
Qty: 30 TABLET | Refills: 1 | Status: SHIPPED | OUTPATIENT
Start: 2018-01-01 | End: 2018-01-01 | Stop reason: SDUPTHER

## 2018-01-01 RX ORDER — TRAMADOL HYDROCHLORIDE 50 MG/1
50 TABLET ORAL EVERY 6 HOURS PRN
Qty: 90 TABLET | Refills: 0 | Status: SHIPPED | OUTPATIENT
Start: 2018-01-01 | End: 2018-01-01

## 2018-01-01 RX ORDER — OXYCODONE HYDROCHLORIDE 10 MG/1
10 TABLET ORAL EVERY 4 HOURS PRN
Qty: 90 TABLET | Refills: 0 | Status: SHIPPED | OUTPATIENT
Start: 2018-01-01 | End: 2018-01-01

## 2018-01-01 RX ORDER — DIPHENHYDRAMINE HYDROCHLORIDE 50 MG/ML
50 INJECTION INTRAMUSCULAR; INTRAVENOUS ONCE
OUTPATIENT
Start: 2018-01-01 | End: 2018-01-01

## 2018-01-01 RX ORDER — OXYCODONE HYDROCHLORIDE 10 MG/1
10 TABLET ORAL EVERY 4 HOURS PRN
COMMUNITY
End: 2018-01-01 | Stop reason: SDUPTHER

## 2018-01-01 RX ORDER — TAMOXIFEN CITRATE 10 MG/1
20 TABLET ORAL 2 TIMES DAILY
COMMUNITY
End: 2018-01-01

## 2018-01-01 RX ORDER — 0.9 % SODIUM CHLORIDE 0.9 %
10 VIAL (ML) INJECTION ONCE
OUTPATIENT
Start: 2018-01-01 | End: 2018-01-01

## 2018-01-01 RX ORDER — RANITIDINE 150 MG/1
150 TABLET ORAL 2 TIMES DAILY
Qty: 60 TABLET | Refills: 1 | Status: SHIPPED | OUTPATIENT
Start: 2018-01-01

## 2018-01-01 RX ORDER — GLIPIZIDE 2.5 MG/1
2.5 TABLET, EXTENDED RELEASE ORAL DAILY
Qty: 30 TABLET | Refills: 1 | Status: SHIPPED | OUTPATIENT
Start: 2018-01-01 | End: 2018-01-01

## 2018-01-01 RX ORDER — OXYCODONE HYDROCHLORIDE 5 MG/1
5 TABLET ORAL EVERY 4 HOURS PRN
Qty: 90 TABLET | Refills: 0 | Status: SHIPPED | OUTPATIENT
Start: 2018-01-01 | End: 2018-01-01 | Stop reason: DRUGHIGH

## 2018-01-01 RX ORDER — OXYCODONE HYDROCHLORIDE 5 MG/1
5 TABLET ORAL EVERY 4 HOURS PRN
Qty: 28 TABLET | Refills: 0 | Status: SHIPPED | OUTPATIENT
Start: 2018-01-01 | End: 2018-01-01 | Stop reason: SDUPTHER

## 2018-01-01 RX ORDER — SODIUM CHLORIDE 9 MG/ML
INJECTION, SOLUTION INTRAVENOUS CONTINUOUS
Status: DISCONTINUED | OUTPATIENT
Start: 2018-01-01 | End: 2018-01-01 | Stop reason: HOSPADM

## 2018-01-01 RX ORDER — GLYCOPYRROLATE 1 MG/1
TABLET ORAL
Qty: 90 TABLET | Refills: 3 | Status: SHIPPED | OUTPATIENT
Start: 2018-01-01

## 2018-01-01 RX ORDER — CALAMINE, MENTHOL, WHITE PETROLATUM, ZINC OXIDE .5; .2; 69; 19.5 G/100G; G/100G; G/100G; G/100G
PASTE TOPICAL
Qty: 113 G | Refills: 3 | Status: SHIPPED | OUTPATIENT
Start: 2018-01-01 | End: 2018-01-01 | Stop reason: ALTCHOICE

## 2018-01-01 RX ORDER — SODIUM CHLORIDE 9 MG/ML
INJECTION, SOLUTION INTRAVENOUS
Status: DISCONTINUED
Start: 2018-01-01 | End: 2018-01-01 | Stop reason: HOSPADM

## 2018-01-01 RX ORDER — MEGESTROL ACETATE 40 MG/1
80 TABLET ORAL 2 TIMES DAILY
COMMUNITY
End: 2018-01-01 | Stop reason: ALTCHOICE

## 2018-01-01 RX ORDER — HYDROCHLOROTHIAZIDE 25 MG/1
12.5 TABLET ORAL DAILY
Qty: 15 TABLET | Refills: 2 | Status: SHIPPED | OUTPATIENT
Start: 2018-01-01 | End: 2018-01-01 | Stop reason: ALTCHOICE

## 2018-01-01 RX ORDER — 0.9 % SODIUM CHLORIDE 0.9 %
1000 INTRAVENOUS SOLUTION INTRAVENOUS ONCE
Status: COMPLETED | OUTPATIENT
Start: 2018-01-01 | End: 2018-01-01

## 2018-01-01 RX ORDER — GABAPENTIN 300 MG/1
300 CAPSULE ORAL 2 TIMES DAILY
Qty: 60 CAPSULE | Refills: 3 | Status: SHIPPED | OUTPATIENT
Start: 2018-01-01 | End: 2018-01-01

## 2018-01-01 RX ORDER — LISINOPRIL 20 MG/1
20 TABLET ORAL DAILY
Qty: 30 TABLET | Refills: 2 | Status: SHIPPED | OUTPATIENT
Start: 2018-01-01 | End: 2018-01-01 | Stop reason: ALTCHOICE

## 2018-01-01 RX ORDER — DULOXETIN HYDROCHLORIDE 20 MG/1
20 CAPSULE, DELAYED RELEASE ORAL NIGHTLY
Qty: 30 CAPSULE | Refills: 0 | Status: SHIPPED | OUTPATIENT
Start: 2018-01-01 | End: 2018-01-01

## 2018-01-01 RX ORDER — ONDANSETRON 4 MG/1
4 TABLET, FILM COATED ORAL EVERY 4 HOURS
Qty: 20 TABLET | Refills: 0 | Status: SHIPPED | OUTPATIENT
Start: 2018-01-01

## 2018-01-01 RX ADMIN — SODIUM CHLORIDE: 9 INJECTION, SOLUTION INTRAVENOUS at 10:43

## 2018-01-01 RX ADMIN — SODIUM CHLORIDE: 9 INJECTION, SOLUTION INTRAVENOUS at 10:00

## 2018-01-01 RX ADMIN — SODIUM CHLORIDE 500 ML: 9 INJECTION, SOLUTION INTRAVENOUS at 15:09

## 2018-01-01 RX ADMIN — SODIUM CHLORIDE 500 ML: 9 INJECTION, SOLUTION INTRAVENOUS at 14:00

## 2018-01-01 RX ADMIN — FERUMOXYTOL 510 MG: 510 INJECTION INTRAVENOUS at 12:41

## 2018-01-01 RX ADMIN — FENTANYL CITRATE 25 MCG: 50 INJECTION INTRAMUSCULAR; INTRAVENOUS at 20:02

## 2018-01-01 RX ADMIN — SODIUM CHLORIDE 1000 ML: 9 INJECTION, SOLUTION INTRAVENOUS at 16:48

## 2018-01-01 RX ADMIN — FENTANYL CITRATE 25 MCG: 50 INJECTION, SOLUTION INTRAMUSCULAR; INTRAVENOUS at 15:08

## 2018-01-01 RX ADMIN — SODIUM CHLORIDE: 9 INJECTION, SOLUTION INTRAVENOUS at 12:41

## 2018-01-01 RX ADMIN — FERUMOXYTOL 510 MG: 510 INJECTION INTRAVENOUS at 10:43

## 2018-01-01 ASSESSMENT — ENCOUNTER SYMPTOMS
DIARRHEA: 1
VOMITING: 0
ABDOMINAL PAIN: 0
NAUSEA: 0
CONSTIPATION: 0
VOMITING: 0
VOMITING: 0
WHEEZING: 0
ABDOMINAL PAIN: 0
SHORTNESS OF BREATH: 0
NAUSEA: 0
COUGH: 0
WHEEZING: 0
WHEEZING: 0
COUGH: 0
BLURRED VISION: 0
VOMITING: 0
DIARRHEA: 0
COUGH: 0
BLOOD IN STOOL: 0
ABDOMINAL PAIN: 0
BACK PAIN: 0
NAUSEA: 0
COUGH: 0
SHORTNESS OF BREATH: 0
SHORTNESS OF BREATH: 0
ABDOMINAL PAIN: 0
DIARRHEA: 0
VOMITING: 0
EYE REDNESS: 0
NAUSEA: 0
DIARRHEA: 1
EYE PAIN: 0
SHORTNESS OF BREATH: 0
CONSTIPATION: 0
NAUSEA: 0
ABDOMINAL PAIN: 0

## 2018-01-01 ASSESSMENT — PAIN DESCRIPTION - DESCRIPTORS: DESCRIPTORS: ACHING;BURNING

## 2018-01-01 ASSESSMENT — PAIN SCALES - GENERAL
PAINLEVEL_OUTOF10: 7
PAINLEVEL_OUTOF10: 8
PAINLEVEL_OUTOF10: 10
PAINLEVEL_OUTOF10: 8

## 2018-01-01 ASSESSMENT — PAIN DESCRIPTION - LOCATION: LOCATION: COCCYX

## 2018-01-01 ASSESSMENT — PAIN DESCRIPTION - ORIENTATION: ORIENTATION: LEFT;MID

## 2018-01-01 ASSESSMENT — PAIN SCALES - WONG BAKER: WONGBAKER_NUMERICALRESPONSE: 8

## 2018-01-01 ASSESSMENT — PATIENT HEALTH QUESTIONNAIRE - PHQ9
SUM OF ALL RESPONSES TO PHQ9 QUESTIONS 1 & 2: 0
1. LITTLE INTEREST OR PLEASURE IN DOING THINGS: 0
SUM OF ALL RESPONSES TO PHQ QUESTIONS 1-9: 0
2. FEELING DOWN, DEPRESSED OR HOPELESS: 0

## 2018-03-12 NOTE — PROGRESS NOTES
Exam   Constitutional: She is oriented to person, place, and time. She appears well-developed and well-nourished. HENT:   Head: Normocephalic and atraumatic. Eyes: Conjunctivae and EOM are normal. Pupils are equal, round, and reactive to light. Right eye exhibits no discharge. Left eye exhibits no discharge. Neck: Normal range of motion. Neck supple. No thyromegaly present. Cardiovascular: Normal rate, regular rhythm, normal heart sounds and intact distal pulses. No murmur heard. Pulmonary/Chest: Effort normal and breath sounds normal. No respiratory distress. She has no wheezes. She has no rales. Abdominal: Soft. Bowel sounds are normal. She exhibits no distension. There is no tenderness. There is no rebound. Musculoskeletal:   Tenderness to palpation over trochanteric bursa on the right. Swelling of olecranon bursa on left. No erythema or warm. TTP   Lymphadenopathy:     She has no cervical adenopathy. Neurological: She is alert and oriented to person, place, and time. She has normal reflexes. Skin:   Moisture associated dermatitis noted between buttocks. Mild blanchable erythema , no open areas       Leny Nolan was seen today for established new doctor. Diagnoses and all orders for this visit:    At high risk for falls   - Patient with only one fall that happened due to tripping. Denies feeling unsteady . Will monitor. Chronic bilateral low back pain without sciatica  -     gabapentin (NEURONTIN) 300 MG capsule; Take 1 capsule by mouth 2 times daily for 30 days. -     diclofenac sodium 1 % GEL; Apply 2 g topically once for 1 dose    Pure hypercholesterolemia    BELÉN (acute kidney injury) ([facility] [address]  -     Comprehensive Metabolic Panel; Future    Anemia, unspecified type  -     CBC; Future    Olecranon bursitis of left elbow  -     BODY FLUID CULTURE; Future    Dermatitis associated with moisture  -     Skin Protectants, Misc.  (CALAZIME SKIN PROTECTANT) PSTE; Apply 1-2 times a day to affected

## 2018-04-12 NOTE — TELEPHONE ENCOUNTER
Called patient's daughter, Marcelo Bains, to give her appt date/time with Dr. April Keane. Patient is scheduled at [facility] on May 2, 2018 at 3:20 pm.  Explained to daughter the address and location:  [address] [place] [facility], [facility], 4th floor, [address]. Also gave Dr. Peterson Hurtado phone number:  [phone]. Daughter understood.

## 2018-04-12 NOTE — TELEPHONE ENCOUNTER
Faxed CT chest, CT abdomen/pelvis orders to [facility] to schedule patient scans. Noted to [facility] to contact patient's daughter, Devin, with the appointment information.

## 2018-04-13 PROBLEM — D50.9 IRON DEFICIENCY ANEMIA: Status: ACTIVE | Noted: 2018-01-01

## 2018-06-08 NOTE — PROGRESS NOTES
Patient received 1 unit(s) of PRBC. Transfusion was tolerated well and the patient was observed for 30 minutes prior to discharge.

## 2018-07-02 NOTE — PROGRESS NOTES
[address] [facility]   [phone]   Varghese Long MD     Patient: Tristan Golden  YOB: 1935  Visit Date: 7/2/18    Maryanne Soto is a [de-identified]y.o. year old female here today for   Chief Complaint   Patient presents with    Urinary Tract Infection     f/u - now has diarrhea, constant excessive bleeding     Cough     wheezing x 3 days        HPI  Patient is a [de-identified]year old female with recurrent endometrial cancer with vaginal involvement. Bleeding and passing of tissue happened Thurs/Friday. Since then has been having bleeding. It was large amounts on Thursday/Friday. Diarrhea started half way through antibiotic. Then started having bowel movements 10 times a day for the past three days. Has been getting weaker. Does not want to eat. Does not feel well. Has not really been walking. Diarrhea is watery. Comes out even if she coughs. Gets lightheaded and dizzy. Almost passed out last night. Review of Systems   Constitutional: Positive for appetite change and fatigue. Negative for chills and fever. Gastrointestinal: Positive for diarrhea. Negative for abdominal pain, nausea and vomiting. Genitourinary: Positive for frequency, vaginal bleeding and vaginal discharge. Negative for dysuria. Neurological: Positive for dizziness and light-headedness. Current Outpatient Prescriptions on File Prior to Visit   Medication Sig Dispense Refill    Incontinence Supply Disposable (DEPEND UNDERWEAR LARGE) MISC Use daily for urinary incontinence 100 each 3    Incontinence Supply Disposable MISC 11in incontinence pad. Use daily as needed for incontinence and post menopausal vaginal bleeding 100 each 3    megestrol (MEGACE) 40 MG tablet Take 80 mg by mouth 2 times daily      tamoxifen (NOLVADEX) 10 MG tablet Take 20 mg by mouth 2 times daily      Skin Protectants, Misc.  (CALAZIME SKIN PROTECTANT) PSTE Apply 1-2 times a day to affected area 113 g 3  aspirin 81 MG EC tablet Take 1 tablet by mouth daily 30 tablet 0    docusate sodium (COLACE) 100 MG capsule Take 1 capsule by mouth 2 times daily 60 capsule 5    ranitidine (ZANTAC) 150 MG tablet Take 1 tablet by mouth 2 times daily 60 tablet 5    acetaminophen (TYLENOL) 500 MG tablet Take 500 mg by mouth every 6 hours as needed for Pain      DULoxetine (CYMBALTA) 20 MG extended release capsule Take 1 capsule by mouth nightly 30 capsule 0    diclofenac sodium 1 % GEL Apply 2 g topically once for 1 dose 1 Tube 3     No current facility-administered medications on file prior to visit. Allergies   Allergen Reactions    Adhesive Tape      Blisters; irritation    Cephalosporins     Gabapentin Other (See Comments)     shaky    Lipitor      constipation    Sulfa Antibiotics Hives    Tamoxifen Other (See Comments)     Muscle pain all over, mental confusion        Past medical, surgical, social and/or family history reviewed, updated as needed, and are non-contributory (unless otherwise stated). Medications, allergies, and problem list also reviewed and updated as needed in patient's record. Wt Readings from Last 3 Encounters:   07/05/18 144 lb 6.4 oz (65.5 kg)   07/02/18 156 lb (70.8 kg)   06/18/18 156 lb (70.8 kg)                   /70 (Site: Left Arm, Position: Sitting)   Pulse 73   Temp 97.8 °F (36.6 °C) (Oral)   Resp 16   Ht 5' 2\" (1.575 m)   Wt 156 lb (70.8 kg)   SpO2 99%   BMI 28.53 kg/m²       Physical Exam   Constitutional: She appears well-developed. No distress. Patient in wheelchair . Appears weak    HENT:   Head: Normocephalic and atraumatic. Cardiovascular: Normal rate, regular rhythm, normal heart sounds and intact distal pulses. No murmur heard. Pulmonary/Chest: Effort normal. No respiratory distress. She has wheezes (scattered wheezing). She has no rales. Good air movement     Abdominal: Soft. Bowel sounds are normal. She exhibits no distension.  There is no

## 2018-07-05 NOTE — ED PROVIDER NOTES
Granulocytes % 0.5 0.0 - 5.0 %    Lymphocytes % 22.2 20.0 - 42.0 %    Monocytes % 9.5 2.0 - 12.0 %    Eosinophils % 1.9 0.0 - 6.0 %    Basophils % 0.8 0.0 - 2.0 %    Neutrophils # 2.46 1.80 - 7.30 E9/L    Immature Granulocytes # 0.02 E9/L    Lymphocytes # 0.84 (L) 1.50 - 4.00 E9/L    Monocytes # 0.36 0.10 - 0.95 E9/L    Eosinophils # 0.07 0.05 - 0.50 E9/L    Basophils # 0.03 0.00 - 0.20 E9/L   Comprehensive Metabolic Panel   Result Value Ref Range    Sodium 135 132 - 146 mmol/L    Potassium 5.5 (H) 3.5 - 5.0 mmol/L    Chloride 99 98 - 107 mmol/L    CO2 15 (L) 22 - 29 mmol/L    Anion Gap 21 (H) 7 - 16 mmol/L    Glucose 101 74 - 109 mg/dL    BUN 58 (H) 8 - 23 mg/dL    CREATININE 4.3 (H) 0.5 - 1.0 mg/dL    GFR Non-African American 10 >=60 mL/min/1.73    GFR African American 12     Calcium 8.9 8.6 - 10.2 mg/dL    Total Protein 6.9 6.4 - 8.3 g/dL    Alb 3.8 3.5 - 5.2 g/dL    Total Bilirubin 0.2 0.0 - 1.2 mg/dL    Alkaline Phosphatase 45 35 - 104 U/L    ALT 12 0 - 32 U/L    AST 12 0 - 31 U/L   Troponin   Result Value Ref Range    Troponin 0.01 0.00 - 0.03 ng/mL   Lactic Acid, Plasma   Result Value Ref Range    Lactic Acid 1.1 0.5 - 2.2 mmol/L       RADIOLOGY:  Interpreted by Radiologist.  XR CHEST 1 VW   Final Result   1. Negative portable chest.        CT Head WO Contrast   Final Result      1. No acute findings. CT ABDOMEN PELVIS WO CONTRAST Additional Contrast? Oral    (Results Pending)       EKG: EKG shows normal sinus rhythm with a rate of 77 beats per minute. Normal axis. T-wave inversions in lateral and high lateral leads, unchanged from previous EKG no ST segment abnormalities unchanged from previous EKG. EKG is otherwise unchanged from previous studies. ------------------------- NURSING NOTES AND VITALS REVIEWED ---------------------------   The nursing notes within the ED encounter and vital signs as below have been reviewed.    /64   Pulse 75   Temp 97.5 °F (36.4 °C) (Oral)   Resp 14   Ht 5' (1.524 m)   Wt 144 lb 6.4 oz (65.5 kg)   SpO2 96%   BMI 28.20 kg/m²   Oxygen Saturation Interpretation: Normal      ---------------------------------------------------PHYSICAL EXAM--------------------------------------      Constitutional/General: Alert and oriented x3, well appearing, non toxic in NAD  Head: NC/AT  Eyes: PERRL, EOMI, mild conjunctival pallor  Mouth: Oropharynx clear, handling secretions,   Neck: Supple, full ROM,   Pulmonary: Lungs clear to auscultation bilaterally, no wheezes, rales, or rhonchi. Not in respiratory distress  Cardiovascular:  Regular rate and rhythm, no murmurs, gallops, or rubs. 2+ distal pulses  Abdomen: Soft, non tender, non distended, rectal exam revealed no external hemorrhoids. No gross blood  : Internal exam was deferred by daughter who states she was told by her physician in [facility] [employer] that no one else she do an internal exam as this can cause worsening hemorrhage. She does not want us to perform this exam. Discussed that this would limit her exam. She is allowing an external visual exam to be performed. Vaginal exam revealed arc brown/black fluid. Heart isolated from the urethra or vagina. Extremities: Moves all extremities x 4. Warm and well perfused  Skin: warm and dry without rash, no pallor or diaphoresis  Neurologic: GCS 15,  Psych: Normal Affect      ------------------------------ ED COURSE/MEDICAL DECISION MAKING----------------------  Medications   0.9 % sodium chloride bolus (0 mLs Intravenous Stopped 18 1509)   0.9 % sodium chloride bolus (0 mLs Intravenous Stopped 18 1520)   fentaNYL (SUBLIMAZE) injection 25 mcg (25 mcg Intravenous Given 18 1508)         Medical Decision Makin  During the vaginal exam there was drainage of a black/brown fluid. It was hard to isolate this as vaginal versus urethral discharge/fluid. A Mendes catheter was placed with minimal output. The output however was dark.  I later checked to be area again to see if the

## 2018-07-05 NOTE — ED NOTES
Bed: 13  Expected date:   Expected time:   Means of arrival:   Comments:     Deanne Camarena RN  07/05/18 8720

## 2018-07-05 NOTE — ED NOTES
Transport will be here in 90 minutes to 2 hours per access center     [employer], RN  07/05/18 5807

## 2018-07-09 NOTE — TELEPHONE ENCOUNTER
Spoke to patient's daughter, Carlota Soto. Was not happy with care from hospice of the [facility] . Would like referral to new  Hospice. Will refer to [facility] hospice. Spoke with [employer] at [facility] about new referral.   Martin Devi please mail note, demo sheet and order to [facility]. Please also call Carlota Soto and let her know new referral was placed.

## 2018-07-10 NOTE — TELEPHONE ENCOUNTER
Cancelled fentanyl script at [place], Spoke to Leti Garcia at [facility] she will put referral on hold for now.

## 2018-07-10 NOTE — TELEPHONE ENCOUNTER
Pharmacy called states fentanyl patches were ordered with quantity of #7 with 30 day supply. States patches come in box of 5 and a 30 day supply would be 10 patches. Please advise.

## 2018-07-16 NOTE — TELEPHONE ENCOUNTER
[facility] pharmacy faxed to get orders for next fill and also is going to do the zofran so I cancelled the one from [place].

## 2018-07-16 NOTE — TELEPHONE ENCOUNTER
Was given at [facility] but was never sent in to pharmacy, unsure if it is from fentanyl patch but took it off a day later due to nausea and headache. Patient was feeling nausea a lot over the weekend.

## 2018-07-16 NOTE — TELEPHONE ENCOUNTER
Sent zofran over. Per hospice nurse , Jacqueline Ellington. Patient with good vital signs, no swelling and clear lungs. Complaining of lower abdominal pain and feeling like she needs to have a bowel movement when she doesn't. Patient appears tired today .      Will follow up

## 2018-07-18 NOTE — TELEPHONE ENCOUNTER
Called and spoke to Dr. Vera Do. 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 Called Aaron Morales and spoke to her about this and that constipation may be caused from above. Ok to give miralax and docusate . If gets diarrhea , advised to back off for a couple days   All questions answered.  Will call with concerns

## 2018-07-20 NOTE — TELEPHONE ENCOUNTER
Pt's daughter, Taz Ruiz,  is concerned about her mother's bleeding. She states that her mother does not know she is in Hospice care. Taz Ruiz would like to know if a blood count can be obtained or a blood transfusion can be given. She questioned whether a blood draw can be done in the home. Since Dr. Demetrio Corey is not presently here, Taz Ruiz was referred to call LIN Sosa at [facility].

## 2018-07-23 NOTE — TELEPHONE ENCOUNTER
Kala Cabrera is on the fentanyl patch  12 mcg. Danny Son says she is still in some pain and is asking if you could increase this to 25 mcg patch and send the script to [facility]. Also she wanted you to know the Kala Cabrera has been waking in the mornings with a headache. She seem to be doing ok with Tylenol.

## 2018-08-03 NOTE — TELEPHONE ENCOUNTER
Called Gadiel Hendrix back ok to increase fentanyl patch. Will start seroquel 25mg Q4H as needed for agitation. Please call patient's daughter and let her know.

## 2018-09-26 NOTE — TELEPHONE ENCOUNTER
Called and spoke to patient's daughter. Not doing well. Will send in robinul. Called nad let hospice nurse on call know. Will call with further questions or concerns.

## 2018-09-27 PROBLEM — N39.0 UTI (URINARY TRACT INFECTION): Status: RESOLVED | Noted: 2017-01-10 | Resolved: 2018-01-01

## 2018-10-09 NOTE — TELEPHONE ENCOUNTER
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 using silvadene cream wants Rx dermaplast [facility] pharmacy #[phone]. pressure ulcer LFT heel  not healing skin black, size of silver dollar, dtr putting antibiotic oinment w/no change. Wants Rx called in as well. Please advise Main hospice # [phone].

## 2018-10-09 NOTE — TELEPHONE ENCOUNTER
Has been having a lot of loose stools and diarrhea. This is causing a lot of burning in vaginal rectal area. Silvadene is not really helping. Recommend dermaplast spray     Pressure ulcer - not soft and mushy . Discussed cushioning and offloading. No other treatment indicated. Will call with further concerns.